# Patient Record
Sex: FEMALE | Race: OTHER | Employment: OTHER | URBAN - METROPOLITAN AREA
[De-identification: names, ages, dates, MRNs, and addresses within clinical notes are randomized per-mention and may not be internally consistent; named-entity substitution may affect disease eponyms.]

---

## 2022-03-29 ENCOUNTER — ESTABLISHED PATIENT (OUTPATIENT)
Dept: URBAN - METROPOLITAN AREA CLINIC 9 | Facility: CLINIC | Age: 67
End: 2022-03-29

## 2022-03-29 DIAGNOSIS — H35.3131: ICD-10-CM

## 2022-03-29 DIAGNOSIS — Z79.899: ICD-10-CM

## 2022-03-29 DIAGNOSIS — H25.13: ICD-10-CM

## 2022-03-29 DIAGNOSIS — H52.03: ICD-10-CM

## 2022-03-29 PROCEDURE — 92134 CPTRZ OPH DX IMG PST SGM RTA: CPT

## 2022-03-29 PROCEDURE — 92015 DETERMINE REFRACTIVE STATE: CPT

## 2022-03-29 PROCEDURE — 92014 COMPRE OPH EXAM EST PT 1/>: CPT

## 2022-03-29 ASSESSMENT — KERATOMETRY
OD_AXISANGLE2_DEGREES: 64
OS_AXISANGLE2_DEGREES: 84
OD_K2POWER_DIOPTERS: 45.00
OS_K1POWER_DIOPTERS: 44.25
OS_K2POWER_DIOPTERS: 45.00
OD_K1POWER_DIOPTERS: 44.25
OD_AXISANGLE_DEGREES: 154
OS_AXISANGLE_DEGREES: 174

## 2022-03-29 ASSESSMENT — VISUAL ACUITY
OD_CC: 20/40
OS_CC: 20/30-1

## 2022-03-29 ASSESSMENT — TONOMETRY
OD_IOP_MMHG: 18
OS_IOP_MMHG: 19

## 2022-05-26 ENCOUNTER — DIAGNOSTICS ONLY (OUTPATIENT)
Dept: URBAN - METROPOLITAN AREA CLINIC 9 | Facility: CLINIC | Age: 67
End: 2022-05-26

## 2022-05-26 DIAGNOSIS — Z79.899: ICD-10-CM

## 2022-05-26 PROCEDURE — 92083 EXTENDED VISUAL FIELD XM: CPT

## 2022-05-26 ASSESSMENT — KERATOMETRY
OD_K2POWER_DIOPTERS: 45.00
OS_AXISANGLE_DEGREES: 174
OS_K2POWER_DIOPTERS: 45.00
OS_AXISANGLE2_DEGREES: 84
OD_AXISANGLE_DEGREES: 154
OD_K1POWER_DIOPTERS: 44.25
OS_K1POWER_DIOPTERS: 44.25
OD_AXISANGLE2_DEGREES: 64

## 2022-10-06 NOTE — PATIENT DISCUSSION
Patient understands condition, prognosis and need for follow up care. Pre-procedure checklist reviewed.    MD aware of maximum contrast dose of 166.5 mL.

## 2022-10-06 NOTE — PATIENT DISCUSSION
Jeremy Pascual didn't work well for patient, [place] is what patient is currently using.  Discussed warm compresses doing QD-BID, and use Theratears PF.

## 2025-05-06 ENCOUNTER — EMERGENCY VISIT (OUTPATIENT)
Age: 70
End: 2025-05-06

## 2025-05-06 DIAGNOSIS — H00.024: ICD-10-CM

## 2025-05-06 PROCEDURE — 99213 OFFICE O/P EST LOW 20 MIN: CPT

## 2025-05-06 RX ORDER — DOXYCYCLINE HYCLATE 100 MG/1
1 TABLET ORAL TWICE A DAY
Start: 2025-05-06

## 2025-05-06 RX ORDER — TOBRAMYCIN / DEXAMETHASONE 3; .5 MG/ML; MG/ML
1 SUSPENSION/ DROPS OPHTHALMIC TWICE A DAY
Start: 2025-05-06

## 2025-05-21 ENCOUNTER — EMERGENCY VISIT (OUTPATIENT)
Age: 70
End: 2025-05-21

## 2025-05-21 DIAGNOSIS — H00.022: ICD-10-CM

## 2025-05-21 DIAGNOSIS — H00.024: ICD-10-CM

## 2025-05-21 PROCEDURE — 99214 OFFICE O/P EST MOD 30 MIN: CPT

## 2025-06-02 ENCOUNTER — FOLLOW UP (OUTPATIENT)
Age: 70
End: 2025-06-02

## 2025-06-02 DIAGNOSIS — H00.022: ICD-10-CM

## 2025-06-02 DIAGNOSIS — H00.024: ICD-10-CM

## 2025-06-02 PROCEDURE — 99214 OFFICE O/P EST MOD 30 MIN: CPT

## 2025-06-06 ENCOUNTER — CLINIC PROCEDURE ONLY (OUTPATIENT)
Age: 70
End: 2025-06-06

## 2025-06-06 DIAGNOSIS — H00.14: ICD-10-CM

## 2025-06-06 PROCEDURE — 99213 OFFICE O/P EST LOW 20 MIN: CPT | Mod: 25

## 2025-06-06 PROCEDURE — 67800 REMOVE EYELID LESION: CPT | Mod: E1

## 2025-06-27 ENCOUNTER — FOLLOW UP (OUTPATIENT)
Age: 70
End: 2025-06-27

## 2025-06-27 DIAGNOSIS — H25.13: ICD-10-CM

## 2025-06-27 DIAGNOSIS — H00.14: ICD-10-CM

## 2025-06-27 PROCEDURE — 99213 OFFICE O/P EST LOW 20 MIN: CPT
